# Patient Record
Sex: FEMALE | Race: WHITE | NOT HISPANIC OR LATINO | URBAN - METROPOLITAN AREA
[De-identification: names, ages, dates, MRNs, and addresses within clinical notes are randomized per-mention and may not be internally consistent; named-entity substitution may affect disease eponyms.]

---

## 2018-02-14 ENCOUNTER — EMERGENCY (EMERGENCY)
Facility: HOSPITAL | Age: 63
LOS: 1 days | Discharge: HOME | End: 2018-02-14
Attending: EMERGENCY MEDICINE
Payer: SELF-PAY

## 2018-02-14 VITALS — TEMPERATURE: 100 F

## 2018-02-14 VITALS — WEIGHT: 160.06 LBS | HEIGHT: 66 IN

## 2018-02-14 DIAGNOSIS — Z98.891 HISTORY OF UTERINE SCAR FROM PREVIOUS SURGERY: Chronic | ICD-10-CM

## 2018-02-14 PROCEDURE — 99285 EMERGENCY DEPT VISIT HI MDM: CPT | Mod: 25

## 2018-02-14 PROCEDURE — 71046 X-RAY EXAM CHEST 2 VIEWS: CPT | Mod: 26

## 2018-02-14 PROCEDURE — 94640 AIRWAY INHALATION TREATMENT: CPT

## 2018-02-14 PROCEDURE — 99284 EMERGENCY DEPT VISIT MOD MDM: CPT

## 2018-02-14 PROCEDURE — 71046 X-RAY EXAM CHEST 2 VIEWS: CPT

## 2018-02-14 RX ORDER — IPRATROPIUM/ALBUTEROL SULFATE 18-103MCG
3 AEROSOL WITH ADAPTER (GRAM) INHALATION ONCE
Qty: 0 | Refills: 0 | Status: COMPLETED | OUTPATIENT
Start: 2018-02-14 | End: 2018-02-14

## 2018-02-14 RX ORDER — ALBUTEROL 90 UG/1
2 AEROSOL, METERED ORAL
Qty: 1 | Refills: 0 | OUTPATIENT
Start: 2018-02-14 | End: 2018-03-15

## 2018-02-14 RX ORDER — ACETAMINOPHEN 500 MG
650 TABLET ORAL ONCE
Qty: 0 | Refills: 0 | Status: COMPLETED | OUTPATIENT
Start: 2018-02-14 | End: 2018-02-14

## 2018-02-14 RX ADMIN — Medication 3 MILLILITER(S): at 17:28

## 2018-02-14 RX ADMIN — Medication 3 MILLILITER(S): at 16:20

## 2018-02-14 RX ADMIN — Medication 100 MILLIGRAM(S): at 16:42

## 2018-02-14 RX ADMIN — Medication 650 MILLIGRAM(S): at 17:28

## 2018-02-14 NOTE — ED PROVIDER NOTE - OBJECTIVE STATEMENT
63 yo F presented to ED with c/o cough, body aches, headache and subjective fevers x 2 days. Pt states that she got the flu vaccine on Friday and the s/s began on Monday. + Smoker. No recent travel or ill contacts. No abd pian. No N/V.

## 2021-03-10 ENCOUNTER — TRANSCRIPTION ENCOUNTER (OUTPATIENT)
Age: 66
End: 2021-03-10

## 2021-12-15 ENCOUNTER — TRANSCRIPTION ENCOUNTER (OUTPATIENT)
Age: 66
End: 2021-12-15

## 2022-03-01 ENCOUNTER — TRANSCRIPTION ENCOUNTER (OUTPATIENT)
Age: 67
End: 2022-03-01

## 2022-04-14 PROBLEM — Z00.00 ENCOUNTER FOR PREVENTIVE HEALTH EXAMINATION: Status: ACTIVE | Noted: 2022-04-14

## 2022-04-28 ENCOUNTER — APPOINTMENT (OUTPATIENT)
Dept: ORTHOPEDIC SURGERY | Facility: CLINIC | Age: 67
End: 2022-04-28
Payer: COMMERCIAL

## 2022-04-28 VITALS — HEIGHT: 66 IN | BODY MASS INDEX: 27 KG/M2 | WEIGHT: 168 LBS

## 2022-04-28 PROCEDURE — 99024 POSTOP FOLLOW-UP VISIT: CPT

## 2022-04-28 PROCEDURE — 73610 X-RAY EXAM OF ANKLE: CPT | Mod: RT

## 2022-04-28 NOTE — ASSESSMENT
[FreeTextEntry1] : continue boot\par given continued pain and swelling, rec CT scan to eval extent of healing. further plan pending CT

## 2022-04-28 NOTE — PHYSICAL EXAM
[Right] : right foot and ankle [NL (40)] : plantar flexion 40 degrees [NL 30)] : inversion 30 degrees [NL (20)] : eversion 20 degrees [5___] : plantar flexion 5[unfilled]/5 [2+] : dorsalis pedis pulse: 2+ [] : patient ambulates without assistive device [TWNoteComboBox7] : dorsiflexion 15 degrees

## 2022-04-28 NOTE — HISTORY OF PRESENT ILLNESS
[Sudden] : sudden [5] : 5 [Dull/Aching] : dull/aching [Intermittent] : intermittent [Nothing helps with pain getting better] : Nothing helps with pain getting better [de-identified] : 2/3/22: fall 3 days ago w/ ankle pain. went to  and given brace. no prior sig ankle probs. +CHF. on Eliquis. 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 RN\par 2/17/22: some improvement. walking w/ full weight in boot\par 3/10/22: continued pain. walking w/ full weight in boot.\par 3/24/22: c/o continued pain. inconsistent w/ boot. walking w/ full weight\par 4/28/22: c/o continued swelling. wearing boot at times [] : no [FreeTextEntry1] : Rt ankle

## 2022-05-04 ENCOUNTER — FORM ENCOUNTER (OUTPATIENT)
Age: 67
End: 2022-05-04

## 2022-05-05 ENCOUNTER — APPOINTMENT (OUTPATIENT)
Dept: CT IMAGING | Facility: CLINIC | Age: 67
End: 2022-05-05
Payer: COMMERCIAL

## 2022-05-05 PROCEDURE — 73700 CT LOWER EXTREMITY W/O DYE: CPT | Mod: RT

## 2022-05-05 PROCEDURE — 76376 3D RENDER W/INTRP POSTPROCES: CPT | Mod: 59,RT

## 2022-05-12 ENCOUNTER — APPOINTMENT (OUTPATIENT)
Dept: ORTHOPEDIC SURGERY | Facility: CLINIC | Age: 67
End: 2022-05-12
Payer: COMMERCIAL

## 2022-05-12 VITALS — HEIGHT: 66 IN | WEIGHT: 168 LBS | BODY MASS INDEX: 27 KG/M2

## 2022-05-12 DIAGNOSIS — Z72.0 TOBACCO USE: ICD-10-CM

## 2022-05-12 DIAGNOSIS — S82.66XA: ICD-10-CM

## 2022-05-12 DIAGNOSIS — S82.61XK DISPLACED FRACTURE OF LATERAL MALLEOLUS OF RIGHT FIBULA, SUBSEQUENT ENCOUNTER FOR CLOSED FRACTURE WITH NONUNION: ICD-10-CM

## 2022-05-12 PROCEDURE — 99214 OFFICE O/P EST MOD 30 MIN: CPT

## 2022-05-12 NOTE — HISTORY OF PRESENT ILLNESS
[Sudden] : sudden [5] : 5 [Dull/Aching] : dull/aching [Intermittent] : intermittent [Nothing helps with pain getting better] : Nothing helps with pain getting better [de-identified] : 2/3/22: fall 3 days ago w/ ankle pain. went to  and given brace. no prior sig ankle probs. +CHF. on Eliquis. 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 walking w/ full weight in boot\par \par 3/10/22: continued pain. walking w/ full weight in boot.\par \par 3/24/22: c/o continued pain. inconsistent w/ boot. walking w/ full weight\par \par 4/28/22: c/o continued swelling. wearing boot at times\par \par 5/12/22 no improvement with pain, continued swelling. walking with boot. CT f/up [] : no [FreeTextEntry1] : Rt ankle [de-identified] : boot [de-identified] : Yang  [de-identified] : MRI

## 2022-05-12 NOTE — ASSESSMENT
[FreeTextEntry1] : discussed dx\par discussed surgical and non surgical options\par patient would like to continue non surgical treatment\par rec bone stim and continue boot. sending for lab work\par discussed smoking cessation\par \par f/up 1 month w/ ankle xray\par \par We discussed the additional risk and side effects of smoking - including but not limited to the negative impact on healing, infection rate, potential failure of surgical implants, nonunion of fracture/fusion healing, and negative patient outcomes. These risks are increased in foot and ankle surgery. The patient was counseled on smoking cessation.\par

## 2022-05-12 NOTE — PHYSICAL EXAM
[Right] : right foot and ankle [NL (40)] : plantar flexion 40 degrees [NL 30)] : inversion 30 degrees [NL (20)] : eversion 20 degrees [5___] : plantar flexion 5[unfilled]/5 [2+] : dorsalis pedis pulse: 2+ [] : no gross deformity [TWNoteComboBox7] : dorsiflexion 15 degrees

## 2022-05-12 NOTE — DATA REVIEWED
[CT Scan] : CT scan [Ankle] : ankle [I reviewed the films/CD and additionally noted] : I reviewed the films/CD and additionally noted [FreeTextEntry1] : nonunited fibula fx

## 2022-06-16 ENCOUNTER — APPOINTMENT (OUTPATIENT)
Dept: ORTHOPEDIC SURGERY | Facility: CLINIC | Age: 67
End: 2022-06-16

## 2022-07-07 ENCOUNTER — APPOINTMENT (OUTPATIENT)
Dept: ORTHOPEDIC SURGERY | Facility: CLINIC | Age: 67
End: 2022-07-07

## 2022-07-07 VITALS — WEIGHT: 168 LBS | HEIGHT: 66 IN | BODY MASS INDEX: 27 KG/M2

## 2022-07-07 PROCEDURE — L4350: CPT

## 2022-07-07 PROCEDURE — 73610 X-RAY EXAM OF ANKLE: CPT | Mod: RT

## 2022-07-07 PROCEDURE — 99214 OFFICE O/P EST MOD 30 MIN: CPT

## 2022-07-07 NOTE — HISTORY OF PRESENT ILLNESS
[Sudden] : sudden [5] : 5 [Dull/Aching] : dull/aching [Intermittent] : intermittent [Nothing helps with pain getting better] : Nothing helps with pain getting better [de-identified] : 2/3/22: fall 3 days ago w/ ankle pain. went to  and given brace. no prior sig ankle probs. +CHF. on Eliquis. 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 walking w/ full weight in boot\par \par 3/10/22: continued pain. walking w/ full weight in boot.\par \par 3/24/22: c/o continued pain. inconsistent w/ boot. walking w/ full weight\par \par 4/28/22: c/o continued swelling. wearing boot at times\par \par 5/12/22 no improvement with pain, continued swelling. walking with boot. CT f/up\par \par 07/07/2022:  reports no improvement. using bone stim for about 5 wks [FreeTextEntry1] : Rt ankle [] : no [de-identified] : boot [de-identified] : Yang  [de-identified] : MRI

## 2022-07-07 NOTE — ASSESSMENT
[FreeTextEntry1] : wbat\par airsport\par continue bone stim\par otc vit D\par reviewed labs - patient to review with pcp\par PT\par f/up 6 wks w/ ankle xray

## 2022-07-07 NOTE — PHYSICAL EXAM
[Right] : right foot and ankle [NL (40)] : plantar flexion 40 degrees [NL 30)] : inversion 30 degrees [NL (20)] : eversion 20 degrees [2+] : dorsalis pedis pulse: 2+ [] : no gross deformity [5___] : eversion 5[unfilled]/5 [FreeTextEntry8] : improved [TWNoteComboBox7] : dorsiflexion 15 degrees

## 2022-08-18 ENCOUNTER — APPOINTMENT (OUTPATIENT)
Dept: ORTHOPEDIC SURGERY | Facility: CLINIC | Age: 67
End: 2022-08-18

## 2022-08-18 VITALS — HEIGHT: 66 IN | BODY MASS INDEX: 27 KG/M2 | WEIGHT: 168 LBS

## 2022-08-18 DIAGNOSIS — S82.61XD DISPLACED FRACTURE OF LATERAL MALLEOLUS OF RIGHT FIBULA, SUBSEQUENT ENCOUNTER FOR CLOSED FRACTURE WITH ROUTINE HEALING: ICD-10-CM

## 2022-08-18 PROCEDURE — 73610 X-RAY EXAM OF ANKLE: CPT | Mod: RT

## 2022-08-18 PROCEDURE — 99213 OFFICE O/P EST LOW 20 MIN: CPT

## 2022-08-18 NOTE — HISTORY OF PRESENT ILLNESS
[Sudden] : sudden [5] : 5 [Dull/Aching] : dull/aching [Intermittent] : intermittent [Nothing helps with pain getting better] : Nothing helps with pain getting better [de-identified] : 2/3/22: fall 3 days ago w/ ankle pain. went to  and given brace. no prior sig ankle probs. +CHF. on Eliquis. 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 walking w/ full weight in boot\par \par 3/10/22: continued pain. walking w/ full weight in boot.\par \par 3/24/22: c/o continued pain. inconsistent w/ boot. walking w/ full weight\par \par 4/28/22: c/o continued swelling. wearing boot at times\par \par 5/12/22 no improvement with pain, continued swelling. walking with boot. CT f/up\par \par 07/07/2022:  reports no improvement. using bone stim for about 5 wks\par \par 08/18/2022: pain improving. walking in reg shoes w/ brace using bone stim. going to PT. [] : no [FreeTextEntry1] : Rt ankle [de-identified] : brace [de-identified] : Yang  [de-identified] : MRI

## 2022-08-18 NOTE — PHYSICAL EXAM
[Right] : right foot and ankle [NL (40)] : plantar flexion 40 degrees [NL 30)] : inversion 30 degrees [5___] : eversion 5[unfilled]/5 [2+] : dorsalis pedis pulse: 2+ [NL (20)] : dorsiflexion 20 degrees [] : non-antalgic [FreeTextEntry8] : improved [TWNoteComboBox7] : dorsiflexion 15 degrees

## 2022-08-18 NOTE — IMAGING
[Right] : right ankle [The fracture is in acceptable alignment. There is progression in healing seen] : The fracture is in acceptable alignment. There is progression in healing seen [Healed fracture] : Healed fracture

## 2022-08-18 NOTE — ASSESSMENT
[FreeTextEntry1] : wbat\par brace for comfort\par continue bone stim\par finish PT\par activity as long\par f/up 3 months w/ ankle xray

## 2022-11-17 ENCOUNTER — APPOINTMENT (OUTPATIENT)
Dept: ORTHOPEDIC SURGERY | Facility: CLINIC | Age: 67
End: 2022-11-17

## 2023-02-15 NOTE — ED PROVIDER NOTE - SKIN NEGATIVE STATEMENT, MLM
Clinical Guide to Interpretation or FeNO Levels :    FeNO  (ppb) LOW INTERMEDIATE HIGH   ADULT VALUES < 25 25-50          > 50   Th2-driven Inflammation Unlikely Likely Significant     Patients FeNO level at this visit : 25 (ppb)    Interpretation of FeNO measurement in adults:  [] FENO is less than 25 ppb implies non eosinophilic airway inflammation or the absence of airway inflammation.    Comment: Low FENO (<25 ppb) in adult asthmatics with persistent symptoms suggests other etiologies for these symptoms and a lower likelihood of benefit from adding or increasing inhaled glucocorticoids.    [x] FENO between 25 and 50 ppb in adults should be interpreted cautiously with reference to the clinical situation (eg, symptomatic, on or off therapy, current smoking).    [] FENO greater than 50 ppb in adults  suggests eosinophilic airway inflammation   Comment: High FENO (>50 ppb) in adult asthmatics even with atypical symptoms suggests glucocorticoid responsiveness. High FENO (>50 ppb) can help identify poor adherence or uncontrolled inflammation in asthma patients with otherwise seemingly controlled asthma.    Discussion:  A FENO less than 25 ppb in adults and less than 20 ppb in children younger than 12 years of age implies noneosinophilic airway inflammation or the absence of airway inflammation.  A FENO greater than 50 ppb in adults or greater than 35 ppb in children suggests eosinophilic airway inflammation.   Values of FENO between 25 and 50 ppb in adults (20 to 35 ppb in children) should be interpreted cautiously with reference to the clinical situation (eg, symptomatic, on or off therapy, current smoking).  A rising FENO with a greater than 20 percent change and more than 25 ppb (20 ppb in children) from a previously stable level suggests increasing eosinophilic airway inflammation, but there are wide inter-individual differences.  A decrease in FENO greater than 20 percent for values over 50 ppb or more than 10  ppb for values less than 50 ppb may be clinically important.  ?FENO in other respiratory diseases - Several other diseases are associated with altered levels of exhaled NO: low levels of FENO have been noted in cystic fibrosis, current smoking, pulmonary hypertension, hypothermia, primary ciliary dyskinesia, and bronchopulmonary dysplasia. Elevated FENO has been noted in atopy, nonasthmatic eosinophilic bronchitis, COPD exacerbations, noncystic fibrosis bronchiectasis, and viral upper respiratory infections.    REFERENCE:  ATS Board of Directors, December 2004, and by the ERS Executive Committee, June 2004. ATS/ERS Recommendations for Standardized Procedures for the Online and Offline Measurement of Exhaled Lower Respiratory Nitric Oxide and Nasal Nitric Oxide. Guideline 2005           no abrasions, no jaundice, no lesions, no pruritis, and no rashes.

## 2023-04-29 ENCOUNTER — NON-APPOINTMENT (OUTPATIENT)
Age: 68
End: 2023-04-29

## 2024-12-10 ENCOUNTER — NON-APPOINTMENT (OUTPATIENT)
Age: 69
End: 2024-12-10

## 2025-04-30 ENCOUNTER — NON-APPOINTMENT (OUTPATIENT)
Age: 70
End: 2025-04-30

## 2025-06-05 ENCOUNTER — APPOINTMENT (OUTPATIENT)
Dept: ELECTROPHYSIOLOGY | Facility: CLINIC | Age: 70
End: 2025-06-05